# Patient Record
Sex: MALE | Race: WHITE | URBAN - METROPOLITAN AREA
[De-identification: names, ages, dates, MRNs, and addresses within clinical notes are randomized per-mention and may not be internally consistent; named-entity substitution may affect disease eponyms.]

---

## 2018-04-06 ENCOUNTER — OFFICE VISIT (OUTPATIENT)
Dept: PEDIATRICS | Facility: CLINIC | Age: 33
End: 2018-04-06
Payer: COMMERCIAL

## 2018-04-06 VITALS
SYSTOLIC BLOOD PRESSURE: 118 MMHG | HEART RATE: 74 BPM | OXYGEN SATURATION: 96 % | TEMPERATURE: 97.7 F | DIASTOLIC BLOOD PRESSURE: 82 MMHG | WEIGHT: 165.3 LBS | HEIGHT: 70 IN | BODY MASS INDEX: 23.66 KG/M2

## 2018-04-06 DIAGNOSIS — Z72.0 TOBACCO USE: ICD-10-CM

## 2018-04-06 DIAGNOSIS — F43.22 ADJUSTMENT DISORDER WITH ANXIOUS MOOD: Primary | ICD-10-CM

## 2018-04-06 DIAGNOSIS — F90.2 ATTENTION DEFICIT HYPERACTIVITY DISORDER (ADHD), COMBINED TYPE: ICD-10-CM

## 2018-04-06 DIAGNOSIS — Z78.9 CAFFEINE USE: ICD-10-CM

## 2018-04-06 PROCEDURE — 99203 OFFICE O/P NEW LOW 30 MIN: CPT | Performed by: INTERNAL MEDICINE

## 2018-04-06 NOTE — PATIENT INSTRUCTIONS
Nice to meet you today!    Thanks for getting your evaluation done - there were some things that pointed to ADHD and others that didn't. The evaluation was complicated by your high caffeine use as lots of caffeine can sometimes mimic ADHD symptoms. I will call the person who did your evaluation and let them know that you're having similar symptoms with much caffeine intake. They may want to retest you or they may feel comfortable making a diagnosis. If they are comfortable making a diagnosis I'll be in touch about a visit to start medications. If we do start meds we usually have more frequent office visits in the beginning.     Referral to start therapy as recommended - these therapists are great at helping teach your mind new skills. They should call you to schedule - if you do not hear from them by middle of next week give them a call (number below).     For now let's plan on having you see me in a month. I'll be in touch if we should change this.     Some other ideas to help you with attention:   ? Plan - have a work schedule, review materials frequently, organize materials and work space, have large projects broken down into smaller segments, and allow for breaks.   ? Take regular breaks to refresh attention.   ? Work in a quiet distraction-free environment.  ? Use lists, reminder, and calendars.  ? Keep frequently used items in a consistent place.  ? Consider audio recording important information.  ? Be seated away from distractions.  ? Sit closer to supervision or instruction.  ? Classmates, coworkers, supervisors, or teachers may use a signal to direct Adalid back to a task.  ? Take regular and frequent breaks to refresh attention.  ? Systematic reinforcement may help with timely work completion.  ? Long tasks could be broken down into shorter segments.  ? Classmates, coworkers, supervisors, or teachers could provide nonverbal support to help Adalid maintain attention and remember instructions  ? When providing  instructions, teachers or supervisors could:  o Reduce distractions and extraneous stimuli.  o Give one step of a multi-step direction at a time.  o Divide longer tasks in to shorter tasks.  o Repeat instructions as needed.

## 2018-04-06 NOTE — PROGRESS NOTES
SUBJECTIVE:   Lance Mcdaniel is a 32 year old male who presents to clinic today for the following health issues:    Lance presents to clinic to establish care today and to discuss his ADHD evaluation.  He was previously followed in [facility] and underwent a evaluation in early March.  As part of that evaluation it was discovered that he has significant caffeine intake-he was taking 6-8 energy drinks a day.  There is question whether his symptoms of fidgetiness and inattention were more related to caffeine or underlying ADHD.  I reviewed his evaluation, which was recently reviewed with the patient on April 2, 2018, there are many things appointed ADHD but a few did not support the diagnosis, including the fact that he describes symptoms emerging in his mid teen years.  The plan was to wean off caffeine and see if his symptoms improved.  He was given a diagnosis of adjustment disorder with anxiety and a provisional diagnosis of ADHD combined type.    Today he tells me that he actually significantly reduce his caffeine intake after his initial evaluation in March.  He reports he is now drinking only about 200 mg of caffeine a day.  He has switched out flavored water and Gatorade for the previous energy drinks he was drinking.  He does notice his body feeling different when he takes caffeine, with more energy.  He otherwise does not notice a significant change in his symptoms.  He endorses difficulty concentrating, impulsivity, and fidgeting.    The report also mentions low self-esteem and poor social relationships.  He tells me he spends a lot of time with his girlfriend that they sometimes want to eat but does not describe other activities that he enjoys.  His assessment recommended establishing with a therapist and he is open to this today.    Problem list and histories reviewed & adjusted, as indicated.  Additional history: as documented    Patient Active Problem List   Diagnosis     Adjustment disorder with anxious  "mood     Attention deficit hyperactivity disorder (ADHD), combined type     Caffeine use     Tobacco use     History reviewed. No pertinent surgical history.    Social History   Substance Use Topics     Smoking status: Current Every Day Smoker     Smokeless tobacco: Never Used     Alcohol use Yes      Comment: socially     Family History   Problem Relation Age of Onset     Family History Negative Mother      Family History Negative Father      Unknown/Adopted Sister          Current Outpatient Prescriptions   Medication Sig Dispense Refill     MELATONIN PO        Not on File    Reviewed and updated as needed this visit by clinical staff       Reviewed and updated as needed this visit by Provider         ROS:  Constitutional, HEENT, cardiovascular, pulmonary, gi and gu systems are negative, except as otherwise noted.    OBJECTIVE:     /82  Pulse 74  Temp 97.7  F (36.5  C) (Oral)  Ht 5' 10\" (1.778 m)  Wt 165 lb 4.8 oz (75 kg)  SpO2 96%  BMI 23.72 kg/m2  Body mass index is 23.72 kg/(m^2).  GENERAL: healthy, alert and no distress  EYES: Eyes grossly normal to inspection, PERRL and conjunctivae and sclerae normal  HENT: ear canals and TM's normal, nose and mouth without ulcers or lesions  NECK: no adenopathy, no asymmetry, masses, or scars and thyroid normal to palpation  RESP: lungs clear to auscultation - no rales, rhonchi or wheezes  CV: regular rate and rhythm, normal S1 S2, no S3 or S4, no murmur, click or rub, no peripheral edema and peripheral pulses strong  ABDOMEN: soft, nontender, no hepatosplenomegaly, no masses and bowel sounds normal  MS: no gross musculoskeletal defects noted, no edema  SKIN: no suspicious lesions or rashes  PSYCH: mentation appears normal, affect normal/bright    Diagnostic Test Results:  Reviewed psych evaluation in CareEverywhere.    ASSESSMENT/PLAN:       ICD-10-CM    1. Adjustment disorder with anxious mood F43.22 MENTAL HEALTH REFERRAL  - Adult; Outpatient Treatment; " Individual/Couples/Family/Group Therapy/Health Psychology; [facility] [phone]; We will contact you to schedule the appointment or please call with any questions   2. Attention deficit hyperactivity disorder (ADHD), combined type F90.2    3. Caffeine use F15.90    4. Tobacco use Z72.0      Today we spent time reviewing his recent assessment.  Given the fact that his symptoms still remain despite a normal amount of caffeine he very well may have ADHD.  I will contact the psychologist did his evaluation to discuss whether they feel comfortable with the diagnosis or if they would like to retest him now that he is off caffeine.    He does have some difficulty fully verbalizing his challenges.  He may do better with paper assessments than verbal.     He was open to the idea of therapy and I have referred him to our behavioral health team.    We briefly discussed that treatment for ADHD generally includes controlled substances and that these are monitored carefully.  I generally only dispensed 1 month at that time, no early refills, no repeat prescriptions for lost prescriptions.    We also discussed the importance of tobacco cessation.  He is not ready to discuss quitting.    Patient Instructions   Nice to meet you today!    Thanks for getting your evaluation done - there were some things that pointed to ADHD and others that didn't. The evaluation was complicated by your high caffeine use as lots of caffeine can sometimes mimic ADHD symptoms. I will call the person who did your evaluation and let them know that you're having similar symptoms with much caffeine intake. They may want to retest you or they may feel comfortable making a diagnosis. If they are comfortable making a diagnosis I'll be in touch about a visit to start medications. If we do start meds we usually have more frequent office visits in the beginning.     Referral to start therapy as recommended - these therapists are great at  helping teach your mind new skills. They should call you to schedule - if you do not hear from them by middle of next week give them a call (number below).     For now let's plan on having you see me in a month. I'll be in touch if we should change this.     Some other ideas to help you with attention:   ? Plan - have a work schedule, review materials frequently, organize materials and work space, have large projects broken down into smaller segments, and allow for breaks.   ? Take regular breaks to refresh attention.   ? Work in a quiet distraction-free environment.  ? Use lists, reminder, and calendars.  ? Keep frequently used items in a consistent place.  ? Consider audio recording important information.  ? Be seated away from distractions.  ? Sit closer to supervision or instruction.  ? Classmates, coworkers, supervisors, or teachers may use a signal to direct Adalid back to a task.  ? Take regular and frequent breaks to refresh attention.  ? Systematic reinforcement may help with timely work completion.  ? Long tasks could be broken down into shorter segments.  ? Classmates, coworkers, supervisors, or teachers could provide nonverbal support to help Adalid maintain attention and remember instructions  ? When providing instructions, teachers or supervisors could:  o Reduce distractions and extraneous stimuli.  o Give one step of a multi-step direction at a time.  o Divide longer tasks in to shorter tasks.  o Repeat instructions as needed.    Brett Li MD  [facility]  ---------------    Addendum - attempted to contact provider at [facility] who did evaluation. The patient signed CareEverywhere but they need a separate form to communicate. Will have MA team contact patient to have form signed and then contact provider again.

## 2018-04-06 NOTE — MR AVS SNAPSHOT
After Visit Summary   4/6/2018    Lance Mcdaniel    MRN: [id number]           Patient Information     Date Of Birth          1985        Visit Information        Provider Department      4/6/2018 9:50 AM Brett Li MD [facility]        Today's Diagnoses     Adjustment disorder with anxious mood    -  1      Care Instructions    Nice to meet you today!    Thanks for getting your evaluation done - there were some things that pointed to ADHD and others that didn't. The evaluation was complicated by your high caffeine use as lots of caffeine can sometimes mimic ADHD symptoms. I will call the person who did your evaluation and let them know that you're having similar symptoms with much caffeine intake. They may want to retest you or they may feel comfortable making a diagnosis. If they are comfortable making a diagnosis I'll be in touch about a visit to start medications. If we do start meds we usually have more frequent office visits in the beginning.     Referral to start therapy as recommended - these therapists are great at helping teach your mind new skills. They should call you to schedule - if you do not hear from them by middle of next week give them a call (number below).     For now let's plan on having you see me in a month. I'll be in touch if we should change this.     Some other ideas to help you with attention:   ? Plan - have a work schedule, review materials frequently, organize materials and work space, have large projects broken down into smaller segments, and allow for breaks.   ? Take regular breaks to refresh attention.   ? Work in a quiet distraction-free environment.  ? Use lists, reminder, and calendars.  ? Keep frequently used items in a consistent place.  ? Consider audio recording important information.  ? Be seated away from distractions.  ? Sit closer to supervision or instruction.  ? Classmates, coworkers, supervisors, or teachers may use a  signal to direct Adalid back to a task.  ? Take regular and frequent breaks to refresh attention.  ? Systematic reinforcement may help with timely work completion.  ? Long tasks could be broken down into shorter segments.  ? Classmates, coworkers, supervisors, or teachers could provide nonverbal support to help Adalid maintain attention and remember instructions  ? When providing instructions, teachers or supervisors could:  o Reduce distractions and extraneous stimuli.  o Give one step of a multi-step direction at a time.  o Divide longer tasks in to shorter tasks.  o Repeat instructions as needed.          Follow-ups after your visit        Additional Services     MENTAL HEALTH REFERRAL  - Adult; Outpatient Treatment; Individual/Couples/Family/Group Therapy/Health Psychology; [facility]: [facility] [phone]; We will contact you to schedule the appointment or please call with any questions       All scheduling is subject to the client's specific insurance plan & benefits, provider/location availability, and provider clinical specialities.  Please arrive 15 minutes early for your first appointment and bring your completed paperwork.    Please be aware that coverage of these services is subject to the terms and limitations of your health insurance plan.  Call member services at your health plan with any benefit or coverage questions.                            Follow-up notes from your care team     Return in about 4 weeks (around 5/4/2018) for Mental Health Follow Up.      Who to contact     If you have questions or need follow up information about today's clinic visit or your schedule please contact [facility] directly at [phone].  Normal or non-critical lab and imaging results will be communicated to you by MyChart, letter or phone within 4 business days after the clinic has received the results. If you do not hear from us within 7 days, please contact the clinic through [employer] or  "phone. If you have a critical or abnormal lab result, we will notify you by phone as soon as possible.  Submit refill requests through [employer] or call your pharmacy and they will forward the refill request to us. Please allow 3 business days for your refill to be completed.          Additional Information About Your Visit        MyChart Information     [employer] lets you send messages to your doctor, view your test results, renew your prescriptions, schedule appointments and more. To sign up, go to [email or web address] . Click on \"Log in\" on the left side of the screen, which will take you to the Welcome page. Then click on \"Sign up Now\" on the right side of the page.     You will be asked to enter the access code listed below, as well as some personal information. Please follow the directions to create your username and password.     Your access code is: [email or web address]  Expires: 2018 10:38 AM     Your access code will  in 90 days. If you need help or a new code, please call your [place] clinic or [phone].        Care EveryWhere ID     This is your Care EveryWhere ID. This could be used by other organizations to access your [facility] medical records  [id number]        Your Vitals Were     Pulse Temperature Height Pulse Oximetry BMI (Body Mass Index)       74 97.7  F (36.5  C) (Oral) 5' 10\" (1.778 m) 96% 23.72 kg/m2        Blood Pressure from Last 3 Encounters:   18 118/82    Weight from Last 3 Encounters:   18 165 lb 4.8 oz (75 kg)              We Performed the Following     MENTAL HEALTH REFERRAL  - Adult; Outpatient Treatment; Individual/Couples/Family/Group Therapy/Health Psychology; [facility]: [facility] [phone]; We will contact you to schedule the appointment or please call with any questions        Primary Care Provider Office Phone # Fax #    Brett Li [phone] [address] [address] [address]        Equal Access " to Services     JOSUÉ TABOR : Wayne Sesay, wafahad cedeno, qachase mccall. So [facility] [phone].    ATENCIÓN: Si habla español, tiene a sung disposición servicios gratuitos de asistencia lingüística. Llame al [phone].    We comply with applicable federal civil rights laws and [place] laws. We do not discriminate on the basis of race, color, national origin, age, disability, sex, sexual orientation, or gender identity.            Thank you!     Thank you for choosing [facility]  for your care. Our goal is always to provide you with excellent care. Hearing back from our patients is one way we can continue to improve our services. Please take a few minutes to complete the written survey that you may receive in the mail after your visit with us. Thank you!             Your Updated Medication List - Protect others around you: Learn how to safely use, store and throw away your medicines at www.disposemymeds.org.          This list is accurate as of 4/6/18 10:38 AM.  Always use your most recent med list.                   Brand Name Dispense Instructions for use Diagnosis    MELATONIN PO

## 2018-04-09 ENCOUNTER — TELEPHONE (OUTPATIENT)
Dept: PEDIATRICS | Facility: CLINIC | Age: 33
End: 2018-04-09

## 2018-04-09 NOTE — TELEPHONE ENCOUNTER
Patient notified, will come in to clinic to sign person to person communication form, await signature.

## 2018-04-09 NOTE — TELEPHONE ENCOUNTER
Please call patient I tried reaching Alexandru Hodges (psychology) to discuss his evaluation but he is unable to speak with me without a release.    The patient signed a [facility] release form for us but the [facility] clinic also needs a release.     He can either   1- Come to our clinic and fill out a communication release so that Leticia can speak to us and we can fax this over. Would use the Person-to-Person Communication form.   2- Stop by his old clinic and ask them for the paper to fill out so that the psychologist can speak with me.     I apologize for the extra work but we want to make sure we have his permission to discuss his health information and need to have documentation of his permission.    Alexandru Hodges Jr., [facility]    [address] [place] [address]    [phone] (Fax)      DEBBIE Li MD  Internal Medicine-Pediatrics

## 2018-04-16 NOTE — TELEPHONE ENCOUNTER
Was unable to connect with patient. Left message to call back about forms, waiting for call back. In the meantime sending out a copy of the right forms to patient with postmarked envelop for return. If no call back within 48 hours will attempt to call patient again.  Magdalena Stern CMA  April 16, 2018, 9:13 AM

## 2018-04-16 NOTE — TELEPHONE ENCOUNTER
Patient came in to sign paper on 4/13/2018 but was given the wrong form to sign. Routing to Dr. Li as an FYI. Will call patient and see if he can come in or if I should mail the right form tp him with stamped envelop.  Magdalena Stern CMA  April 16, 2018, 9:10 AM

## 2018-04-16 NOTE — TELEPHONE ENCOUNTER
Please route to me when he has signed the form and it is faxed so I can contact the person who did his assessment.     DEBBIE Li MD  Internal Medicine-Pediatrics

## 2018-04-25 NOTE — TELEPHONE ENCOUNTER
Third attempt to try and reach patient, unable to reach. Left brief VM for call back.  Magdalena Stern CMA  April 25, 2018, 1:11 PM

## 2018-04-25 NOTE — TELEPHONE ENCOUNTER
Patient calls.  He will stop by later today to get the forms-please leave at the FD lower level for patient.    I huddled with Magdalena and she is aware, will leave the forms at the FD.  Lacy Bethea RN  Message handled by Nurse Triage.

## 2022-12-13 NOTE — TELEPHONE ENCOUNTER
Attempted to call patient, unable to reach him - left brief message to call clinic back.  Magdalena Stern CMA  April 20, 2018, 10:55 AM      Yes